# Patient Record
Sex: MALE | Race: WHITE | NOT HISPANIC OR LATINO
[De-identification: names, ages, dates, MRNs, and addresses within clinical notes are randomized per-mention and may not be internally consistent; named-entity substitution may affect disease eponyms.]

---

## 2021-09-16 ENCOUNTER — DASHBOARD ENCOUNTERS (OUTPATIENT)
Age: 71
End: 2021-09-16

## 2021-09-16 ENCOUNTER — WEB ENCOUNTER (OUTPATIENT)
Dept: URBAN - NONMETROPOLITAN AREA CLINIC 13 | Facility: CLINIC | Age: 71
End: 2021-09-16

## 2021-09-16 ENCOUNTER — LAB OUTSIDE AN ENCOUNTER (OUTPATIENT)
Dept: URBAN - NONMETROPOLITAN AREA CLINIC 13 | Facility: CLINIC | Age: 71
End: 2021-09-16

## 2021-09-16 ENCOUNTER — OFFICE VISIT (OUTPATIENT)
Dept: URBAN - NONMETROPOLITAN AREA CLINIC 13 | Facility: CLINIC | Age: 71
End: 2021-09-16
Payer: MEDICARE

## 2021-09-16 DIAGNOSIS — D50.8 ACQUIRED IRON DEFICIENCY ANEMIA DUE TO DECREASED ABSORPTION: ICD-10-CM

## 2021-09-16 DIAGNOSIS — R93.5 ABNORMAL ABDOMINAL CT SCAN: ICD-10-CM

## 2021-09-16 DIAGNOSIS — K62.5 RECTAL BLEEDING: ICD-10-CM

## 2021-09-16 PROBLEM — 267530009: Status: ACTIVE | Noted: 2021-09-16

## 2021-09-16 PROCEDURE — 99244 OFF/OP CNSLTJ NEW/EST MOD 40: CPT | Performed by: INTERNAL MEDICINE

## 2021-09-16 PROCEDURE — 99204 OFFICE O/P NEW MOD 45 MIN: CPT | Performed by: INTERNAL MEDICINE

## 2021-11-08 PROBLEM — 15634181000119107: Status: ACTIVE | Noted: 2021-09-16

## 2021-11-24 ENCOUNTER — OFFICE VISIT (OUTPATIENT)
Dept: URBAN - NONMETROPOLITAN AREA SURGERY CENTER 1 | Facility: SURGERY CENTER | Age: 71
End: 2021-11-24

## 2021-12-29 ENCOUNTER — OFFICE VISIT (OUTPATIENT)
Dept: URBAN - NONMETROPOLITAN AREA SURGERY CENTER 1 | Facility: SURGERY CENTER | Age: 71
End: 2021-12-29
Payer: MEDICARE

## 2021-12-29 DIAGNOSIS — D12.4 ADENOMA OF DESCENDING COLON: ICD-10-CM

## 2021-12-29 DIAGNOSIS — K62.5 ANAL BLEEDING: ICD-10-CM

## 2021-12-29 DIAGNOSIS — R93.3 ABN FINDINGS-GI TRACT: ICD-10-CM

## 2021-12-29 DIAGNOSIS — D12.3 ADENOMA OF TRANSVERSE COLON: ICD-10-CM

## 2021-12-29 PROCEDURE — 45385 COLONOSCOPY W/LESION REMOVAL: CPT | Performed by: INTERNAL MEDICINE

## 2021-12-29 PROCEDURE — G8907 PT DOC NO EVENTS ON DISCHARG: HCPCS | Performed by: INTERNAL MEDICINE

## 2022-01-14 ENCOUNTER — TELEPHONE ENCOUNTER (OUTPATIENT)
Dept: URBAN - NONMETROPOLITAN AREA CLINIC 2 | Facility: CLINIC | Age: 72
End: 2022-01-14

## 2024-03-04 NOTE — HPI-TODAY'S VISIT:
The patient was referred by Dr. Harvey Moss for rectal bleeding.   A copy of this document is being forwarded to the referring provider. Patient presents following recent hospitalization for rectal bleeding.  He was hospitalized at [facility] where he was seen by Dr. Lopes.  Stool studies were negative.  CT scan showed colitis involving the sigmoid and rectum.  He was treated with antibiotics and then referred here.  20 minutes were spent reviewing records. Patient had a recent knee replacement.  He was less mobile than usual and was taking pain medication.  He did get constipated during this time.  He went several days with no bowel movement.  He took suppositories and laxatives.  He then developed lower abdominal pain, rectal bleeding and fever.  And went to the emergency room.  Evaluation for septic knee was negative. Since leaving the hospital he has had no further bleeding or pain.  His bowel movements are normal. He has no previous history of inflammatory bowel disease.  He had not been taking NSAIDs and was not on blood thinners.  There is no family history of colon cancer, colon polyps or inflammatory bowel disease.  He had a normal colonoscopy about 6 years ago. He has no upper GI symptoms. Yes